# Patient Record
(demographics unavailable — no encounter records)

---

## 2024-11-01 NOTE — CONSULT LETTER
[Dear  ___] : Dear  [unfilled], [Consult Letter:] : I had the pleasure of evaluating your patient, [unfilled]. [( Thank you for referring [unfilled] for consultation for _____ )] : Thank you for referring [unfilled] for consultation for [unfilled] [Please see my note below.] : Please see my note below. [Consult Closing:] : Thank you very much for allowing me to participate in the care of this patient.  If you have any questions, please do not hesitate to contact me. [Sincerely,] : Sincerely, [FreeTextEntry3] : Stephen Ochoa MD  Gastroenterology [facility]

## 2024-11-01 NOTE — ASSESSMENT
[FreeTextEntry1] : NACHO ORNELAS was advised to undergo endoscopy to which he agreed. The procedure will be performed in [facility] with the assistance of an anesthesiologist. He was given a booklet distributed by the [employer] explaining the procedure in detail and he understood the risks of the procedure not limited to infection, bleeding, perforation or non- diagnosis of gastric or esophageal cancer.  He was advised that he could not drive home, if he chooses to receive sedation. Further diagnostic and treatment recommendations will be based upon the procedure and any biopsies, if they are taken. Thank you for allowing me to participate in this [facility] care.   I spent 33 minutes with the patient and his daughter and answered all of their questions.
No

## 2024-11-01 NOTE — HISTORY OF PRESENT ILLNESS
[FreeTextEntry1] : He complains of recent onset of indigestion with decreased appetite and weight loss of 8 to 10 pounds.  Denies abdominal pain denies heartburn he had a colonoscopy 3 years ago which was negative.  He is presently on pantoprazole which is not really helping him.  He has very severe arthritis which she is getting injections and he has been on diclofenac.  His recent labs were all normal according to his daughter except for elevated ESR and C-reactive protein.  He has a strong family history of esophageal cancer specifically his brother and his nephew  His daughter was in the room

## 2024-11-01 NOTE — REVIEW OF SYSTEMS
[Recent Weight Loss (___ Lbs)] : recent [unfilled] ~Ulb weight loss [Negative] : Heme/Lymph [FreeTextEntry2] : Decreased appetite [FreeTextEntry7] : Indigestion